# Patient Record
Sex: MALE | Race: WHITE
[De-identification: names, ages, dates, MRNs, and addresses within clinical notes are randomized per-mention and may not be internally consistent; named-entity substitution may affect disease eponyms.]

---

## 2017-12-12 ENCOUNTER — HOSPITAL ENCOUNTER (OUTPATIENT)
Dept: HOSPITAL 17 - ESDC | Age: 77
Discharge: HOME | End: 2017-12-12
Attending: UROLOGY
Payer: MEDICARE

## 2017-12-12 DIAGNOSIS — N47.1: Primary | ICD-10-CM

## 2017-12-12 DIAGNOSIS — N35.9: ICD-10-CM

## 2017-12-12 PROCEDURE — 54161 CIRCUM 28 DAYS OR OLDER: CPT

## 2017-12-12 PROCEDURE — 00920 ANES PX MALE GENITALIA NOS: CPT

## 2017-12-12 PROCEDURE — 53020 INCISION OF URETHRA: CPT

## 2017-12-12 NOTE — TN
cc:

RICHARD SAUCEDO M.D.

****

 

 

DATE OF SURGERY

12/12/2017

 

PREOPERATIVE DIAGNOSES

1. Phimosis (ICD - 10 code of N47.1).

2. Meatal stenosis (ICD - 10 code of N35.9.

 

POSTOPERATIVE DIAGNOSES

1. Phimosis (ICD - 10 code of N47.1).

2. Meatal stenosis (ICD - 10 code of N35.9.

 

PROCEDURE

1. Complex circumcision (CPT code 25918 - 22).

2. Meatotomy (CPT code 97506 - 59).

 

INDICATION

Mr. Cornelius is a 77-year-old gentleman who complained of lower urinary tract

symptoms and on evaluation was found to have severe phimosis of the foreskin

with complete adherence of the foreskin to the glans penis and meatal stenosis.

He present now for definitive treatment.

 

FINDINGS

Are severe phimosis with excessive scarring and adhesions of the foreskin to

the glans penis with resultant meatal stenosis.   There is only a small opening

dorsally to be able to even visualize the glans.

 

PROCEDURE DETAILS

Procedure well as risks, benefits were explained to the patient.  Informed

consent was obtained. The patient was taken major operative theater where he

was placed in supine position.  The patient was identified as well as the

operative site and a universal time-out was performed in standard fashion.  At

this time general anesthetic and prophylactic intravenous antibiotics of Ancef

1 gram was administered. After adequate anesthetic his genitalia and groin were

prepped and draped in the usual sterile fashion.  At this time a penile block

was performed using 1% lidocaine plain and 0.25% Marcaine plain. This was done

in standard fashion.  At this time due to the complete adhesion of the foreskin

a dorsal slit was performed at the 12 o'clock position between two mosquito

clamps and tenotomies were used to incise the tissue so that the glans could

presumably be released however, due to the excessive scarring there was

essentially no evidence of the coronal sulcus or obvious plane so essentially

meticulous dissection once we were able to identify the coronal sulcus was

performed circumferentially and all of the tissue was incised sharply so that

the coronal sulcus could be liberated.  At this time a ventral meatotomy was

made in the ventral fashion using a blade over the Dickey sounds to open up

the meatus.  At this time the formal circumcision was then performed using the

sleeve technique. A sterile skin marker was made to tejinder the level of the

coronal sulcus in the unretracted state and then in the retracted state about a

centimeter below the coronal sulcus and the distal shaft skin. This was then

incised using the 11 blade scalpel and then electrocautery was used for

meticulous hemostasis. After both incisions were made circumferentially that

sleeve of tissue was excised using the electrocautery.  After confirming

hemostasis the skin edges were opposed using 3-0 chromic suture in an

interrupted pattern first in quadrants and then these were bisected until there

was complete apposition of the tissue.  At this time Polysporin ointment and

dry dressings were applied.  The patient emerged from anesthetic without

difficulty and transferred to recovery room in stable condition to be

discharged home when criteria is met.

 

Please note that there was substantial additional work required for this

procedure due to the severity of the adhesions, this caused increased

intensity, time and some technical difficulty with this procedure.  Thus the

services rendered were significantly more complex than described for the CPT

code in question.

 

 

 

 

                              _________________________________

                              MD MAL Meneses/MARINA

D:  12/12/2017/5:09 PM

T:  12/12/2017/5:27 PM

Visit #:  [id number]

Job #:  [id number]

## 2018-05-20 ENCOUNTER — HOSPITAL ENCOUNTER (EMERGENCY)
Dept: HOSPITAL 17 - NEPC | Age: 78
Discharge: HOME | End: 2018-05-20
Payer: MEDICARE

## 2018-05-20 VITALS — OXYGEN SATURATION: 96 %

## 2018-05-20 VITALS — WEIGHT: 220.46 LBS | BODY MASS INDEX: 32.65 KG/M2 | HEIGHT: 69 IN

## 2018-05-20 VITALS — DIASTOLIC BLOOD PRESSURE: 83 MMHG | SYSTOLIC BLOOD PRESSURE: 192 MMHG

## 2018-05-20 VITALS — SYSTOLIC BLOOD PRESSURE: 161 MMHG | DIASTOLIC BLOOD PRESSURE: 70 MMHG

## 2018-05-20 VITALS
SYSTOLIC BLOOD PRESSURE: 207 MMHG | HEART RATE: 75 BPM | TEMPERATURE: 99 F | RESPIRATION RATE: 17 BRPM | OXYGEN SATURATION: 96 % | DIASTOLIC BLOOD PRESSURE: 84 MMHG

## 2018-05-20 DIAGNOSIS — Z95.1: ICD-10-CM

## 2018-05-20 DIAGNOSIS — E78.00: ICD-10-CM

## 2018-05-20 DIAGNOSIS — E11.9: ICD-10-CM

## 2018-05-20 DIAGNOSIS — R94.31: ICD-10-CM

## 2018-05-20 DIAGNOSIS — H91.93: ICD-10-CM

## 2018-05-20 DIAGNOSIS — Z87.19: ICD-10-CM

## 2018-05-20 DIAGNOSIS — H53.8: ICD-10-CM

## 2018-05-20 DIAGNOSIS — Z95.0: ICD-10-CM

## 2018-05-20 DIAGNOSIS — I10: ICD-10-CM

## 2018-05-20 DIAGNOSIS — R42: Primary | ICD-10-CM

## 2018-05-20 DIAGNOSIS — I44.7: ICD-10-CM

## 2018-05-20 LAB
ALBUMIN SERPL-MCNC: 3.4 GM/DL (ref 3.4–5)
ALP SERPL-CCNC: 43 U/L (ref 45–117)
ALT SERPL-CCNC: 49 U/L (ref 12–78)
AST SERPL-CCNC: 55 U/L (ref 15–37)
BACTERIA #/AREA URNS HPF: (no result) /HPF
BASOPHILS # BLD AUTO: 0.1 TH/MM3 (ref 0–0.2)
BASOPHILS NFR BLD: 0.9 % (ref 0–2)
BILIRUB SERPL-MCNC: 0.4 MG/DL (ref 0.2–1)
BUN SERPL-MCNC: 17 MG/DL (ref 7–18)
CALCIUM SERPL-MCNC: 8.9 MG/DL (ref 8.5–10.1)
CHLORIDE SERPL-SCNC: 106 MEQ/L (ref 98–107)
COLOR UR: YELLOW
CREAT SERPL-MCNC: 1.03 MG/DL (ref 0.6–1.3)
EOSINOPHIL # BLD: 0.1 TH/MM3 (ref 0–0.4)
EOSINOPHIL NFR BLD: 1.4 % (ref 0–4)
ERYTHROCYTE [DISTWIDTH] IN BLOOD BY AUTOMATED COUNT: 15.5 % (ref 11.6–17.2)
GFR SERPLBLD BASED ON 1.73 SQ M-ARVRAT: 70 ML/MIN (ref 89–?)
GLUCOSE SERPL-MCNC: 132 MG/DL (ref 74–106)
GLUCOSE UR STRIP-MCNC: (no result) MG/DL
HCO3 BLD-SCNC: 24.2 MEQ/L (ref 21–32)
HCT VFR BLD CALC: 43.8 % (ref 39–51)
HGB BLD-MCNC: 14.5 GM/DL (ref 13–17)
HGB UR QL STRIP: (no result)
KETONES UR STRIP-MCNC: (no result) MG/DL
LYMPHOCYTES # BLD AUTO: 2.8 TH/MM3 (ref 1–4.8)
LYMPHOCYTES NFR BLD AUTO: 35.2 % (ref 9–44)
MAGNESIUM SERPL-MCNC: 2 MG/DL (ref 1.5–2.5)
MCH RBC QN AUTO: 29.4 PG (ref 27–34)
MCHC RBC AUTO-ENTMCNC: 33.2 % (ref 32–36)
MCV RBC AUTO: 88.5 FL (ref 80–100)
MONOCYTE #: 0.8 TH/MM3 (ref 0–0.9)
MONOCYTES NFR BLD: 9.8 % (ref 0–8)
MUCOUS THREADS #/AREA URNS LPF: (no result) /LPF
NEUTROPHILS # BLD AUTO: 4.2 TH/MM3 (ref 1.8–7.7)
NEUTROPHILS NFR BLD AUTO: 52.7 % (ref 16–70)
NITRITE UR QL STRIP: (no result)
PLATELET # BLD: 194 TH/MM3 (ref 150–450)
PMV BLD AUTO: 9 FL (ref 7–11)
PROT SERPL-MCNC: 6.7 GM/DL (ref 6.4–8.2)
RBC # BLD AUTO: 4.94 MIL/MM3 (ref 4.5–5.9)
SODIUM SERPL-SCNC: 140 MEQ/L (ref 136–145)
SP GR UR STRIP: 1.02 (ref 1–1.03)
SQUAMOUS #/AREA URNS HPF: <1 /HPF (ref 0–5)
TROPONIN I SERPL-MCNC: (no result) NG/ML (ref 0.02–0.05)
URINE LEUKOCYTE ESTERASE: (no result)
WBC # BLD AUTO: 7.9 TH/MM3 (ref 4–11)

## 2018-05-20 PROCEDURE — 85025 COMPLETE CBC W/AUTO DIFF WBC: CPT

## 2018-05-20 PROCEDURE — 82552 ASSAY OF CPK IN BLOOD: CPT

## 2018-05-20 PROCEDURE — 80053 COMPREHEN METABOLIC PANEL: CPT

## 2018-05-20 PROCEDURE — 83735 ASSAY OF MAGNESIUM: CPT

## 2018-05-20 PROCEDURE — 99285 EMERGENCY DEPT VISIT HI MDM: CPT

## 2018-05-20 PROCEDURE — 81001 URINALYSIS AUTO W/SCOPE: CPT

## 2018-05-20 PROCEDURE — 96360 HYDRATION IV INFUSION INIT: CPT

## 2018-05-20 PROCEDURE — 85610 PROTHROMBIN TIME: CPT

## 2018-05-20 PROCEDURE — 70450 CT HEAD/BRAIN W/O DYE: CPT

## 2018-05-20 PROCEDURE — 85730 THROMBOPLASTIN TIME PARTIAL: CPT

## 2018-05-20 PROCEDURE — 84484 ASSAY OF TROPONIN QUANT: CPT

## 2018-05-20 PROCEDURE — 71045 X-RAY EXAM CHEST 1 VIEW: CPT

## 2018-05-20 PROCEDURE — 82550 ASSAY OF CK (CPK): CPT

## 2018-05-20 PROCEDURE — 93005 ELECTROCARDIOGRAM TRACING: CPT

## 2018-05-20 NOTE — RADRPT
EXAM DATE/TIME:  05/20/2018 22:15 

 

[facility] COMPARISON:     

No previous studies available for comparison.

 

 

INDICATIONS :     

Dizziness.

                      

 

RADIATION DOSE:     

56.35 CTDIvol (mGy) 

 

 

 

MEDICAL HISTORY :     

Cardiovascular disease. Hypertension. Diabetes mellitus type 2.

 

SURGICAL HISTORY :      

CABG Pacemaker.

 

ENCOUNTER:      

Initial

 

ACUITY:      

1 day

 

PAIN SCALE:      

0/10

 

LOCATION:        

cranial 

 

TECHNIQUE:     

Multiple contiguous axial images were obtained of the head.  Using automated exposure control and adj
ustment of the mA and/or kV according to patient size, radiation dose was kept as low as reasonably a
chievable to obtain optimal diagnostic quality images.   DICOM format image data is available electro
nically for review and comparison.  

 

FINDINGS:     

 

CEREBRUM:     

Mild diffuse cerebral atrophy. The ventricles are normal for degree of atrophy.  No evidence of midli
ne shift, mass lesion, hemorrhage or acute infarction.  No extra-axial fluid collections are seen.

 

POSTERIOR FOSSA:     

The cerebellum and brainstem are intact.  The 4th ventricle is midline.  The cerebellopontine angle i
s unremarkable.

 

EXTRACRANIAL:     

The visualized portion of the orbits is intact. Postsurgical sutures in the right mastoid air cells

 

SKULL:     

The calvaria is intact.  No evidence of skull fracture.

 

CONCLUSION:     

1. Senescent changes without acute intracranial abnormality.

 

 

 

 Jonas Soto MD on May 20, 2018 at 22:30           

Board Certified Radiologist.

 This report was verified electronically.

## 2018-05-20 NOTE — PD
HPI


Chief Complaint:  General Weakness


Time Seen by Provider:  21:18


Travel History


International Travel<30 days:  No


Contact w/Intl Traveler<30days:  No


Traveled to known affect area:  No





History of Present Illness


HPI


70-year-old male presents to the emergency department for evaluation of 

dizziness and difficulty focusing his eyes and the TV that started 7 PM this 

evening.  Patient states he was watching TV when he felt slightly dizzy.  He 

states the dizziness is worse with movement.  He states that when he would 

focus on the TV, he would notice that his eyes would not focus and became "

cross eyed".  However, he looked away, they would resolve but when he looked 

back at the TV with start up again.  Patient denies headache.  He denies any 

chest pain or shortness of breath.  No abdominal pain.  No nausea, vomiting, 

diarrhea.  He does report history of Mnire's disease, but states this feels 

different.  Patient has history of cardiac bypass and pacemaker.  He also has 

diet-controlled diabetes.  Moderate severity.  Patient reports chronic weakness 

of the left arm and right leg from polio as a child.  He denies any new 

weakness or syncope.





PFSH


Past Medical History


Arthritis:  No


Asthma:  No


Autoimmune Disease:  No


Blood Disorders:  No


Anxiety:  No


Depression:  No


Heart Rhythm Problems:  Yes


Cancer:  No


Cardiovascular Problems:  Yes


High Cholesterol:  Yes


Chemotherapy:  No


Chest Pain:  Yes


Congestive Heart Failure:  No


COPD:  No


Cerebrovascular Accident:  No


Diabetes:  Yes


Patient Takes Glucophage:  No


Diminished Hearing:  Yes (SHAD HEARING AIDS)


Endocrine:  No


Gastrointestinal Disorders:  Yes


GERD:  No


Glaucoma:  No


Genitourinary:  No


Headaches:  No


Hepatitis:  No


Hiatal Hernia:  No


Hypertension:  Yes


Immune Disorder:  No


Kidney Stones:  No


Medical other:  Yes (POLIO)


Musculoskeletal:  No


Neurologic:  No


Psychiatric:  No


Reproductive:  No


Respiratory:  No


Migraines:  No


Myocardial Infarction:  No


Radiation Therapy:  No


Renal Failure:  No


Seizures:  No


Sickle Cell Disease:  No


Sleep Apnea:  No


Thyroid Disease:  No


Ulcer:  Yes (hx bleed )


Influenza Vaccination:  Yes


Pregnant?:  Not Pregnant





Past Surgical History


Abdominal Surgery:  No


AICD:  No


Appendectomy:  No


Arteriovenous Shunt:  No


Cardiac Surgery:  Yes (cabgx2)


Cholecystectomy:  No


Coronary Artery Bypass Graft:  Yes (2 VESSEL MARCH 2002)


Ear Surgery:  Yes (MENEIERS DISEASE-EAR SURGERY X 2--1981/1998)


Endocrine Surgery:  No


Eye Surgery:  No


Genitourinary Surgery:  No


Gynecologic Surgery:  No


Insulin Pump:  No


Joint Replacement:  No


Neurologic Surgery:  Yes (LAMINECTOMY  LUMBAR SPINE 1991)


Oral Surgery:  No


Pacemaker:  Yes ([facility])


Thoracic Surgery:  Yes





Social History


Alcohol Use:  No


Tobacco Use:  No


Substance Use:  No





Allergies-Medications


(Allergen,Severity, Reaction):  


Coded Allergies:  


     No Known Allergies (Verified , 6/22/16)


Reported Meds & Prescriptions





Reported Meds & Active Scripts


Active


Reported


Fibercon (Calcium Polycarbophil) 625 Mg Tab 625 Mg PO BID PRN


Probiotic (Saccharomyces Boulardii) 250 Mg Cap 250 Mg PO BID


Multi Vitamin Daily (Multiple Vitamin) 1 Tab Tab   


Fish Oil 1200 mg (Omega-3 Fatty Acids) 360 Mg-1,200 Mg Cap   


Calcium 600+D Plus Minerals (Calcium Carbonate-Vitamin D W/Minerals) 600-400 Mg-

Unit Tab 1 Tab PO BID


Niacin 500 Mg Tab 500 Mg PO DAILY


Aspirin 81 Mg Chew 81 Mg CHEW DAILY


Propranolol ER 24 HR (Propranolol HCl) 120 Mg Cap 120 Mg PO DAILY


Primidone 50 Mg Tab 50 Mg PO BID


Crestor (Rosuvastatin Calcium) 40 Mg Tab 40 Mg PO DAILY


Fenofibrate 145 Mg Tab 145 Mg PO DAILY


Valsartan 160 Mg Tab 160 Mg PO DAILY


Omeprazole 40 Mg Cap 40 Mg  BID


Tamsulosin (Tamsulosin HCl) 0.4 Mg Cap 0.8 Mg PO HS








Review of Systems


Except as stated in HPI:  all other systems reviewed are Neg





Physical Exam


Narrative


GENERAL: Well-nourished, well-developed elderly male patient, afebrile.


SKIN: Focused skin assessment warm/dry.


HEAD: Normocephalic.  Atraumatic.


ENT: Mucosa pink and moist. No erythema or exudates. No uvular edema. No uvular

, palatal, or tonsillar deviation.  


Airway patent. Nasal turbinates appear normal without nasal blood, purulent 

drainage or septal hematoma.  Bilateral tympanic membranes clear without 

erythema or perforation.


EYES: No scleral icterus. No injection or drainage. 


NECK: Supple, trachea midline. No JVD or lymphadenopathy.


CARDIOVASCULAR: Regular rate and rhythm without murmurs, gallops, or rubs.  

Bilateral radial and pedal pulses are 2+.


RESPIRATORY: Breath sounds equal bilaterally. No accessory muscle use.  Lung 

sounds are clear to auscultation.


GASTROINTESTINAL: Abdomen soft, non-tender, nondistended. 


MUSCULOSKELETAL: No cyanosis, or edema.  Bilateral upper and lower extremity 

strength 5/5.  All extremities are neurovascularly intact.


BACK: Nontender without obvious deformity. No CVA tenderness. 


NEUROLOGICAL: Awake and alert. Cranial nerves II through XII intact.  Motor and 

sensory grossly within normal  


limits. Five out of 5 muscle strength in all muscle groups.  Normal speech.  

Finger to nose is normal with the right hand.  He is unable to complete with 

the left hand due to chronic weakness of the left arm.  Heel-to-shin is normal 

bilaterally.





Data


Data


Last Documented VS





Vital Signs








  Date Time  Temp Pulse Resp B/P (MAP) Pulse Ox O2 Delivery O2 Flow Rate FiO2


 


5/20/18 22:16  71  165/72 (103)    





  82  215/83 (127)    





    192/95 (127)    


 


5/20/18 21:35     96 Room Air  


 


5/20/18 21:18 99.0  17     








Orders





 Orders


Electrocardiogram (5/20/18 21:32)


Complete Blood Count With Diff (5/20/18 21:32)


Comprehensive Metabolic Panel (5/20/18 21:32)


Magnesium (Mg) (5/20/18 21:32)


Ckmb (Isoenzyme) Profile (5/20/18 21:32)


Troponin I (5/20/18 21:32)


Act Partial Throm Time (Ptt) (5/20/18 21:32)


Prothrombin Time / Inr (Pt) (5/20/18 21:32)


Urinalysis - C+S If Indicated (5/20/18 21:32)


Chest, Single Ap (5/20/18 21:32)


Ct Brain W/O Iv Contrast(Rout) (5/20/18 21:32)


Ecg Monitoring (5/20/18 21:32)


Iv Access Insert/Monitor (5/20/18 21:32)


Oximetry (5/20/18 21:32)


Meclizine (Antivert) (5/20/18 21:45)


Sodium Chloride 0.9% Flush (Ns Flush) (5/20/18 21:45)


Orthostatic Vital Signs (5/20/18 21:32)


Sodium Chlorid 0.9% 500 Ml Inj (Ns 500 M (5/20/18 21:45)


CKMB (5/20/18 21:35)


CKMB% (5/20/18 21:35)


Ed Discharge Order (5/20/18 23:00)





Labs





Laboratory Tests








Test


  5/20/18


21:35 5/20/18


22:39


 


White Blood Count 7.9 TH/MM3  


 


Red Blood Count 4.94 MIL/MM3  


 


Hemoglobin 14.5 GM/DL  


 


Hematocrit 43.8 %  


 


Mean Corpuscular Volume 88.5 FL  


 


Mean Corpuscular Hemoglobin 29.4 PG  


 


Mean Corpuscular Hemoglobin


Concent 33.2 % 


  


 


 


Red Cell Distribution Width 15.5 %  


 


Platelet Count 194 TH/MM3  


 


Mean Platelet Volume 9.0 FL  


 


Neutrophils (%) (Auto) 52.7 %  


 


Lymphocytes (%) (Auto) 35.2 %  


 


Monocytes (%) (Auto) 9.8 %  


 


Eosinophils (%) (Auto) 1.4 %  


 


Basophils (%) (Auto) 0.9 %  


 


Neutrophils # (Auto) 4.2 TH/MM3  


 


Lymphocytes # (Auto) 2.8 TH/MM3  


 


Monocytes # (Auto) 0.8 TH/MM3  


 


Eosinophils # (Auto) 0.1 TH/MM3  


 


Basophils # (Auto) 0.1 TH/MM3  


 


CBC Comment DIFF FINAL  


 


Differential Comment   


 


Blood Urea Nitrogen 17 MG/DL  


 


Creatinine 1.03 MG/DL  


 


Random Glucose 132 MG/DL  


 


Total Protein 6.7 GM/DL  


 


Albumin 3.4 GM/DL  


 


Calcium Level 8.9 MG/DL  


 


Magnesium Level 2.0 MG/DL  


 


Alkaline Phosphatase 43 U/L  


 


Aspartate Amino Transf


(AST/SGOT) 55 U/L 


  


 


 


Alanine Aminotransferase


(ALT/SGPT) 49 U/L 


  


 


 


Total Bilirubin 0.4 MG/DL  


 


Sodium Level 140 MEQ/L  


 


Potassium Level 4.3 MEQ/L  


 


Chloride Level 106 MEQ/L  


 


Carbon Dioxide Level 24.2 MEQ/L  


 


Anion Gap 10 MEQ/L  


 


Estimat Glomerular Filtration


Rate 70 ML/MIN 


  


 


 


Total Creatine Kinase 191 U/L  


 


Creatine Kinase MB 2.4 NG/ML  


 


Troponin I


  LESS THAN 0.02


NG/ML 


 











MDM


Medical Decision Making


Medical Screen Exam Complete:  Yes


Emergency Medical Condition:  Yes


Medical Record Reviewed:  Yes


Interpretation(s)





Last Impressions








Head CT 5/20/18 2132 Signed





Impressions: 





 Service Date/Time:  Hiren, May 20, 2018 22:15 - CONCLUSION:  1. Senescent 





 changes without acute intracranial abnormality.     Jonas Soto MD 


 


Chest X-Ray 5/20/18 2132 Signed





Impressions: 





 Service Date/Time:  Hiren, May 20, 2018 21:44 - CONCLUSION:  1. No acute 





 abnormality or significant interval change     Jonas Soto MD 








Differential Diagnosis


Vertigo versus intracranial abnormality versus ACS versus electrode abnormality 

versus dehydration


Narrative Course


70-year-old male presents to the emergency department for evaluation of 

dizziness.  He denies any dizziness as long as he is lying still.  EKG shows 

sinus rhythm and left bundle branch block, heart rate 76.  According to chart, 

he has history of chronic left bundle branch block.  CBC, CMP, magnesium, CK, 

troponin, PTT, PT/INR, UA, chest x-ray, CT the brain are ordered and pending.  

Patient is given normal saline 500 mL bolus, meclizine 25 mg p.o.





CBC shows no acute abnormality.  CMP shows no acute abnormality.  Magnesium is 

2.0.  CK is 191.  Troponin is less than 0.02.  UA [-].  Chest x-ray shows No 

acute abnormality or significant interval change.  CT of the brain shows 

senescent changes without acute intracranial abnormality.





Exam and labs are reassuring.  Patient is stable for discharge from all his 

primary care physician.





The patient was discharged in stable condition with instructions, including 

return instructions and follow up instructions.





Diagnosis





 Primary Impression:  


 Dizziness


Referrals:  


Primary Care Physician


call for appointment


Patient Instructions:  Dizziness (ED), General Instructions





***Additional Instructions:  


Follow-up with a primary care physician.


Return to the emergency department for any acute worsening of symptoms.


***Med/Other Pt SpecificInfo:  No Change to Meds


Disposition:  01 DISCHARGE HOME


Condition:  Stable











Marisol Majano LANCE May 20, 2018 21:41

## 2018-05-20 NOTE — RADRPT
EXAM DATE/TIME:  05/20/2018 21:44 

 

[facility] COMPARISON:     

CHEST SINGLE AP, June 23, 2016, 12:13.

 

                     

INDICATIONS :     

Short of breath.

                     

 

MEDICAL HISTORY :     

None.          

 

SURGICAL HISTORY :        

Pacemaker. CABG.

 

ENCOUNTER:     

Initial                                        

 

ACUITY:     

1 day      

 

PAIN SCORE:     

0/10

 

LOCATION:     

Bilateral  chest

 

FINDINGS:     

No new focal pleural or parenchymal opacities. Dual-lead pacemaker in place. Median sternotomy wires.
 The cardiomediastinal contours are unremarkable.  Osseous structures are intact.

 

CONCLUSION:     

1. No acute abnormality or significant interval change 

 

 

 Jonas Soto MD on May 20, 2018 at 21:53           

Board Certified Radiologist.

 This report was verified electronically.

## 2018-05-21 LAB
INR PPP: 1.1 RATIO
PROTHROMBIN TIME: 11.3 SEC (ref 9.8–11.6)

## 2018-05-21 NOTE — EKG
Date Performed: 05/20/2018       Time Performed: 21:25:09

 

PTAGE:      78 years

 

EKG:      Sinus rhythm 

 

 LEFT BUNDLE BRANCH BLOCK ABNORMAL ECG 

 

NO PREVIOUS TRACING            

 

DOCTOR:   Jake Mooney  Interpretating Date/Time  05/21/2018 08:04:45